# Patient Record
Sex: MALE | Race: WHITE | NOT HISPANIC OR LATINO | URBAN - METROPOLITAN AREA
[De-identification: names, ages, dates, MRNs, and addresses within clinical notes are randomized per-mention and may not be internally consistent; named-entity substitution may affect disease eponyms.]

---

## 2018-02-26 ENCOUNTER — APPOINTMENT (RX ONLY)
Dept: URBAN - METROPOLITAN AREA CLINIC 38 | Facility: CLINIC | Age: 38
Setting detail: DERMATOLOGY
End: 2018-02-26

## 2018-02-26 ENCOUNTER — RX ONLY (OUTPATIENT)
Age: 38
Setting detail: RX ONLY
End: 2018-02-26

## 2018-02-26 ENCOUNTER — APPOINTMENT (RX ONLY)
Dept: URBAN - METROPOLITAN AREA CLINIC 37 | Facility: CLINIC | Age: 38
Setting detail: DERMATOLOGY
End: 2018-02-26

## 2018-02-26 DIAGNOSIS — B07.8 OTHER VIRAL WARTS: ICD-10-CM

## 2018-02-26 PROBLEM — L55.1 SUNBURN OF SECOND DEGREE: Status: ACTIVE | Noted: 2018-02-26

## 2018-02-26 PROBLEM — L23.7 ALLERGIC CONTACT DERMATITIS DUE TO PLANTS, EXCEPT FOOD: Status: ACTIVE | Noted: 2018-02-26

## 2018-02-26 PROCEDURE — ? BENIGN DESTRUCTION

## 2018-02-26 PROCEDURE — ? COUNSELING

## 2018-02-26 PROCEDURE — 17110 DESTRUCTION B9 LES UP TO 14: CPT

## 2018-02-26 RX ORDER — CLINDAMYCIN PHOSPHATE 10 MG/ML
1 SOLUTION TOPICAL ONCE DAILY
Qty: 1 | Refills: 4 | Status: ERX

## 2018-02-26 ASSESSMENT — LOCATION SIMPLE DESCRIPTION DERM
LOCATION SIMPLE: RIGHT INFERIOR EYELID
LOCATION SIMPLE: RIGHT INFERIOR EYELID

## 2018-02-26 ASSESSMENT — LOCATION ZONE DERM
LOCATION ZONE: EYELID
LOCATION ZONE: EYELID

## 2018-02-26 ASSESSMENT — LOCATION DETAILED DESCRIPTION DERM
LOCATION DETAILED: RIGHT MEDIAL INFERIOR EYELID
LOCATION DETAILED: RIGHT MEDIAL INFERIOR EYELID

## 2018-02-26 NOTE — PROCEDURE: BENIGN DESTRUCTION
Detail Level: Detailed
Add 52 Modifier (Optional): no
Consent: The patient's consent was obtained including but not limited to risks of crusting, scabbing, blistering, scarring, darker or lighter pigmentary change, recurrence, incomplete removal and infection.
Medical Necessity Clause: This procedure was medically necessary because the lesions that were treated were:
Post-Care Instructions: I reviewed with the patient in detail post-care instructions. Patient is to wear sunprotection, and avoid picking at any of the treated lesions. Pt may apply Vaseline to crusted or scabbing areas.
Anesthesia Volume In Cc: 0.1
Anesthesia Type: 1% lidocaine with epinephrine
Treatment Number (Will Not Render If 0): 0
Medical Necessity Information: It is in your best interest to select a reason for this procedure from the list below. All of these items fulfill various CMS LCD requirements except the new and changing color options.

## 2018-02-26 NOTE — HPI: SKIN LESION (SKIN TAGS)
How Severe Are They?: mild
Is This A New Presentation, Or A Follow-Up?: Skin Lesion
Additional History: Pt wants the skin tag removed. He states it is irritated and itchy.

## 2018-02-26 NOTE — PROCEDURE: BENIGN DESTRUCTION
Consent: The patient's consent was obtained including but not limited to risks of crusting, scabbing, blistering, scarring, darker or lighter pigmentary change, recurrence, incomplete removal and infection.
Render Post-Care Instructions In Note?: no
Medical Necessity Clause: This procedure was medically necessary because the lesions that were treated were:
Medical Necessity Information: It is in your best interest to select a reason for this procedure from the list below. All of these items fulfill various CMS LCD requirements except the new and changing color options.
Anesthesia Type: 1% lidocaine with epinephrine
Post-Care Instructions: I reviewed with the patient in detail post-care instructions. Patient is to wear sunprotection, and avoid picking at any of the treated lesions. Pt may apply Vaseline to crusted or scabbing areas.
Detail Level: Detailed
Treatment Number (Will Not Render If 0): 0
Anesthesia Volume In Cc: 0.1

## 2018-07-20 ENCOUNTER — APPOINTMENT (RX ONLY)
Dept: URBAN - METROPOLITAN AREA CLINIC 38 | Facility: CLINIC | Age: 38
Setting detail: DERMATOLOGY
End: 2018-07-20

## 2018-07-20 ENCOUNTER — APPOINTMENT (RX ONLY)
Dept: URBAN - METROPOLITAN AREA CLINIC 37 | Facility: CLINIC | Age: 38
Setting detail: DERMATOLOGY
End: 2018-07-20

## 2018-07-20 DIAGNOSIS — L20.89 OTHER ATOPIC DERMATITIS: ICD-10-CM

## 2018-07-20 PROBLEM — L20.84 INTRINSIC (ALLERGIC) ECZEMA: Status: ACTIVE | Noted: 2018-07-20

## 2018-07-20 PROCEDURE — 96372 THER/PROPH/DIAG INJ SC/IM: CPT

## 2018-07-20 PROCEDURE — ? SEPARATE AND IDENTIFIABLE DOCUMENTATION

## 2018-07-20 PROCEDURE — ? PRESCRIPTION

## 2018-07-20 PROCEDURE — 99212 OFFICE O/P EST SF 10 MIN: CPT | Mod: 25

## 2018-07-20 PROCEDURE — ? COUNSELING

## 2018-07-20 PROCEDURE — ? INJECTION

## 2018-07-20 RX ORDER — TRIAMCINOLONE ACETONIDE 1 MG/G
CREAM TOPICAL BID
Qty: 1 | Refills: 1 | Status: ERX | COMMUNITY
Start: 2018-07-20

## 2018-07-20 RX ADMIN — TRIAMCINOLONE ACETONIDE THIN LAYER: 1 CREAM TOPICAL at 00:00

## 2018-07-20 ASSESSMENT — LOCATION SIMPLE DESCRIPTION DERM
LOCATION SIMPLE: LEFT BUTTOCK
LOCATION SIMPLE: LEFT PRETIBIAL REGION
LOCATION SIMPLE: LEFT BUTTOCK
LOCATION SIMPLE: LEFT PRETIBIAL REGION
LOCATION SIMPLE: RIGHT PRETIBIAL REGION
LOCATION SIMPLE: RIGHT PRETIBIAL REGION

## 2018-07-20 ASSESSMENT — LOCATION DETAILED DESCRIPTION DERM
LOCATION DETAILED: RIGHT DISTAL PRETIBIAL REGION
LOCATION DETAILED: LEFT BUTTOCK
LOCATION DETAILED: LEFT BUTTOCK
LOCATION DETAILED: LEFT PROXIMAL PRETIBIAL REGION
LOCATION DETAILED: RIGHT DISTAL PRETIBIAL REGION
LOCATION DETAILED: RIGHT PROXIMAL PRETIBIAL REGION
LOCATION DETAILED: LEFT DISTAL PRETIBIAL REGION
LOCATION DETAILED: LEFT DISTAL PRETIBIAL REGION
LOCATION DETAILED: LEFT PROXIMAL PRETIBIAL REGION
LOCATION DETAILED: RIGHT PROXIMAL PRETIBIAL REGION

## 2018-07-20 ASSESSMENT — LOCATION ZONE DERM
LOCATION ZONE: TRUNK
LOCATION ZONE: LEG
LOCATION ZONE: TRUNK
LOCATION ZONE: LEG

## 2018-07-20 NOTE — PROCEDURE: COUNSELING
Detail Level: Detailed
Patient Specific Counseling (Will Not Stick From Patient To Patient): TMC cream, BID, every day for two weeks and return for recheck.

## 2018-07-20 NOTE — HPI: RASH
How Severe Is Your Rash?: mild
Is This A New Presentation, Or A Follow-Up?: Rash
Additional History: Rash first came up in April, he went to the [facility] in June and was given a Cortisone injection, an oral antibiotic and a topical cream.  Rash went but came back up after the medication wore off. He went back to [facility] and was given a stronger medication. Patient at first thought he got into some poison Ivy.

## 2018-07-20 NOTE — HPI: RASH
How Severe Is Your Rash?: mild
Is This A New Presentation, Or A Follow-Up?: Rash
Additional History: Rash first came up in April, he went to the [facility] in June and was given a Cortisone injection, an oral antibiotic and a topical cream.  Rash went but came back up after the medication wore off. He went back to [facility] and was given a stronger medication. Patient at first thought he got into some poison Ivy.

## 2018-07-20 NOTE — PROCEDURE: INJECTION
Post-Care Instructions: I reviewed with the patient in detail post-care instructions. Patient understands to keep the injection sites clean and call the clinic if there is any redness, swelling or pain.
Dose Administered (Numbers Only - Mg, G, Mcg, Units, Cc): 40
Procedure Information: Please note that the numeric value listed in the Medication (1) and associated J-code units and Medication (2) and associated J-code units variables are j-code amounts and do not represent either the concentration or the total amount of the medications injected.  I strongly recommend selecting no to the Render J-code information in note question. This will allow your note to be more clear. If you are billing j-codes with your injection codes you need to document the total amount of the medication injected. This amount should match the j-code units. For example, if you are injecting Triamcinolone 40mg as an intramuscular injection you would select 40 for the dose field and mg for the units. This would allow you to document  with 4 units (40mg = 10mg x 4). The total volume is not used to calculate j-codes only the amount of the medication administered.
Bill J-Code: yes
Administered By (Optional): MARYJANE
Expiration Date (Optional): 8/2019
Medication (1) And Associated J-Code Units: Triamcinolone acetonide, 10mg
Lot # (Optional): [id number]
Route: IM
Consent: The risks of the medication was reviewed with the patient.  Patient denies HTN, diabetes, glaucoma, HIV/TB, and stomach ulcers.
Medication (2) And Associated J-Code Units: Depomedrol, 40mg
Total Volume Injected In Cc (Will Not Affected Billing): 2.0
Detail Level: None
Units: mg
Expiration Date (Optional): 06/2020
Treatment Number: 0
Lot # (Optional): [id number]

## 2018-07-20 NOTE — PROCEDURE: COUNSELING
Patient Specific Counseling (Will Not Stick From Patient To Patient): TMC cream, BID, every day for two weeks and return for recheck.
Detail Level: Detailed

## 2018-07-20 NOTE — PROCEDURE: INJECTION
Detail Level: None
Units: mg
Expiration Date (Optional): 06/2020
Route: IM
Consent: The risks of the medication was reviewed with the patient.  Patient denies HTN, diabetes, glaucoma, HIV/TB, and stomach ulcers.
Procedure Information: Please note that the numeric value listed in the Medication (1) and associated J-code units and Medication (2) and associated J-code units variables are j-code amounts and do not represent either the concentration or the total amount of the medications injected.  I strongly recommend selecting no to the Render J-code information in note question. This will allow your note to be more clear. If you are billing j-codes with your injection codes you need to document the total amount of the medication injected. This amount should match the j-code units. For example, if you are injecting Triamcinolone 40mg as an intramuscular injection you would select 40 for the dose field and mg for the units. This would allow you to document  with 4 units (40mg = 10mg x 4). The total volume is not used to calculate j-codes only the amount of the medication administered.
Dose Administered (Numbers Only - Mg, G, Mcg, Units, Cc): 40
Post-Care Instructions: I reviewed with the patient in detail post-care instructions. Patient understands to keep the injection sites clean and call the clinic if there is any redness, swelling or pain.
Render J-Code Information In Note?: yes
Lot # (Optional): [id number]
Medication (2) And Associated J-Code Units: Depomedrol, 40mg
Medication (1) And Associated J-Code Units: Triamcinolone acetonide, 10mg
Treatment Number: 0
Lot # (Optional): [id number]
Expiration Date (Optional): 8/2019
Total Volume Injected In Cc (Will Not Affected Billing): 2.0
Administered By (Optional): YURIDIA

## 2018-08-17 ENCOUNTER — APPOINTMENT (RX ONLY)
Dept: URBAN - METROPOLITAN AREA CLINIC 38 | Facility: CLINIC | Age: 38
Setting detail: DERMATOLOGY
End: 2018-08-17

## 2018-08-17 ENCOUNTER — APPOINTMENT (RX ONLY)
Dept: URBAN - METROPOLITAN AREA CLINIC 37 | Facility: CLINIC | Age: 38
Setting detail: DERMATOLOGY
End: 2018-08-17

## 2018-08-17 DIAGNOSIS — L20.89 OTHER ATOPIC DERMATITIS: ICD-10-CM

## 2018-08-17 PROBLEM — L20.84 INTRINSIC (ALLERGIC) ECZEMA: Status: ACTIVE | Noted: 2018-08-17

## 2018-08-17 PROCEDURE — 99212 OFFICE O/P EST SF 10 MIN: CPT

## 2018-08-17 PROCEDURE — ? PRESCRIPTION

## 2018-08-17 PROCEDURE — ? ORDER TESTS

## 2018-08-17 PROCEDURE — ? COUNSELING

## 2018-08-17 RX ORDER — TRIAMCINOLONE ACETONIDE 1 MG/G
1 OINTMENT TOPICAL BID
Qty: 1 | Refills: 0 | Status: ERX | COMMUNITY
Start: 2018-08-17

## 2018-08-17 RX ADMIN — TRIAMCINOLONE ACETONIDE 1: 1 OINTMENT TOPICAL at 14:10

## 2018-08-17 ASSESSMENT — LOCATION DETAILED DESCRIPTION DERM
LOCATION DETAILED: RIGHT PROXIMAL PRETIBIAL REGION
LOCATION DETAILED: RIGHT PROXIMAL PRETIBIAL REGION
LOCATION DETAILED: LEFT PROXIMAL PRETIBIAL REGION
LOCATION DETAILED: LEFT PROXIMAL PRETIBIAL REGION

## 2018-08-17 ASSESSMENT — LOCATION SIMPLE DESCRIPTION DERM
LOCATION SIMPLE: LEFT PRETIBIAL REGION
LOCATION SIMPLE: LEFT PRETIBIAL REGION
LOCATION SIMPLE: RIGHT PRETIBIAL REGION
LOCATION SIMPLE: RIGHT PRETIBIAL REGION

## 2018-08-17 ASSESSMENT — LOCATION ZONE DERM
LOCATION ZONE: LEG
LOCATION ZONE: LEG

## 2018-08-17 NOTE — PROCEDURE: COUNSELING
Detail Level: Simple
Patient Specific Counseling (Will Not Stick From Patient To Patient): Did a bacterial culture and changed the topical from cream to ointment. Discussed that he may not have absorbed the IM steroid and maybe oral would be a better choice but we opted to try topical only at this time.

## 2018-08-17 NOTE — PROCEDURE: COUNSELING
Patient Specific Counseling (Will Not Stick From Patient To Patient): Did a bacterial culture and changed the topical from cream to ointment. Discussed that he may not have absorbed the IM steroid and maybe oral would be a better choice but we opted to try topical only at this time.
Detail Level: Simple

## 2018-08-17 NOTE — PROCEDURE: ORDER TESTS
Lab Facility: [id number]
Billing Type: Third-Party Bill
Bill For Surgical Tray: no
Expected Date Of Service: 08/17/2018
Performing Laboratory: [id number]

## 2018-08-23 ENCOUNTER — RX ONLY (OUTPATIENT)
Age: 38
Setting detail: RX ONLY
End: 2018-08-23

## 2018-08-23 RX ORDER — CIPROFLOXACIN 500 MG/1
1 TABLET, FILM COATED ORAL BID
Qty: 14 | Refills: 0 | Status: ERX | COMMUNITY
Start: 2018-08-23

## 2018-10-03 ENCOUNTER — RX ONLY (OUTPATIENT)
Age: 38
Setting detail: RX ONLY
End: 2018-10-03

## 2018-10-03 RX ORDER — CLOBETASOL PROPIONATE 0.5 MG/G
1 OINTMENT TOPICAL
Qty: 1 | Refills: 0 | Status: ERX | COMMUNITY
Start: 2018-10-03

## 2019-01-01 ENCOUNTER — RX ONLY (OUTPATIENT)
Age: 39
Setting detail: RX ONLY
End: 2019-01-01

## 2021-09-28 ENCOUNTER — OFFICE VISIT (OUTPATIENT)
Dept: URBAN - METROPOLITAN AREA CLINIC 94 | Facility: CLINIC | Age: 41
End: 2021-09-28

## 2024-01-01 ENCOUNTER — RX ONLY (OUTPATIENT)
Age: 44
Setting detail: RX ONLY
End: 2024-01-01

## 2024-05-13 ENCOUNTER — APPOINTMENT (RX ONLY)
Dept: URBAN - METROPOLITAN AREA CLINIC 164 | Facility: CLINIC | Age: 44
Setting detail: DERMATOLOGY
End: 2024-05-13

## 2024-05-13 DIAGNOSIS — L73.0 ACNE KELOID: ICD-10-CM | Status: INADEQUATELY CONTROLLED

## 2024-05-13 PROCEDURE — ? COUNSELING

## 2024-05-13 PROCEDURE — ? PRESCRIPTION MEDICATION MANAGEMENT

## 2024-05-13 PROCEDURE — 99204 OFFICE O/P NEW MOD 45 MIN: CPT

## 2024-05-13 PROCEDURE — ? PRESCRIPTION

## 2024-05-13 RX ORDER — CLOBETASOL PROPIONATE 0.5 MG/ML
SOLUTION TOPICAL QHS
Qty: 50 | Refills: 3 | Status: ERX | COMMUNITY
Start: 2024-05-13

## 2024-05-13 RX ORDER — DOXYCYCLINE HYCLATE 100 MG/1
CAPSULE, GELATIN COATED ORAL
Qty: 60 | Refills: 0 | Status: ERX | COMMUNITY
Start: 2024-05-13

## 2024-05-13 RX ADMIN — DOXYCYCLINE HYCLATE: 100 CAPSULE, GELATIN COATED ORAL at 00:00

## 2024-05-13 RX ADMIN — CLOBETASOL PROPIONATE: 0.5 SOLUTION TOPICAL at 00:00

## 2024-05-13 ASSESSMENT — LOCATION SIMPLE DESCRIPTION DERM: LOCATION SIMPLE: POSTERIOR SCALP

## 2024-05-13 ASSESSMENT — LOCATION DETAILED DESCRIPTION DERM: LOCATION DETAILED: MID-OCCIPITAL SCALP

## 2024-05-13 ASSESSMENT — LOCATION ZONE DERM: LOCATION ZONE: SCALP

## 2024-05-13 NOTE — PROCEDURE: PRESCRIPTION MEDICATION MANAGEMENT
Render In Strict Bullet Format?: No
Initiate Treatment: Doxycycline 100mg po bid x 4 weeks. \\nClobetasol solution bid, 2 weeks on, 2 weeks off.
Detail Level: Zone

## 2024-05-13 NOTE — PROCEDURE: MIPS QUALITY
Quality 226: Preventive Care And Screening: Tobacco Use: Screening And Cessation Intervention: Patient screened for tobacco use and is an ex/non-smoker
Quality 431: Preventive Care And Screening: Unhealthy Alcohol Use - Screening: Patient not identified as an unhealthy alcohol user when screened for unhealthy alcohol use using a systematic screening method
Quality 130: Documentation Of Current Medications In The Medical Record: Current Medications Documented
Detail Level: Detailed
Name And Contact Information For Health Care Proxy: Yolanda Harvey (spouse), [phone]
Quality 47: Advance Care Plan: Advance Care Planning discussed and documented; advance care plan or surrogate decision maker documented in the medical record.

## 2024-05-13 NOTE — HPI: RASH
Is This A New Presentation, Or A Follow-Up?: Rash
How Severe Is Your Rash?: mild
Additional History: Patient stated that he has been treated in past but  not sure of the names of the meds. Knows one was an alcohol based solution. They did help but didn't take completely away.

## 2024-08-15 ENCOUNTER — APPOINTMENT (RX ONLY)
Dept: URBAN - METROPOLITAN AREA CLINIC 164 | Facility: CLINIC | Age: 44
Setting detail: DERMATOLOGY
End: 2024-08-15

## 2024-08-15 DIAGNOSIS — L73.0 ACNE KELOID: ICD-10-CM | Status: RESOLVING

## 2024-08-15 PROCEDURE — 99214 OFFICE O/P EST MOD 30 MIN: CPT

## 2024-08-15 PROCEDURE — ? COUNSELING

## 2024-08-15 PROCEDURE — ? PRESCRIPTION MEDICATION MANAGEMENT

## 2024-08-15 PROCEDURE — ? PRESCRIPTION

## 2024-08-15 RX ORDER — TRETINOIN 0.5 MG/G
THIN FILM GEL TOPICAL DAILY
Qty: 45 | Refills: 2 | Status: ACTIVE

## 2024-08-15 ASSESSMENT — LOCATION DETAILED DESCRIPTION DERM: LOCATION DETAILED: MID-OCCIPITAL SCALP

## 2024-08-15 ASSESSMENT — LOCATION ZONE DERM: LOCATION ZONE: SCALP

## 2024-08-15 ASSESSMENT — LOCATION SIMPLE DESCRIPTION DERM: LOCATION SIMPLE: POSTERIOR SCALP

## 2024-08-15 NOTE — HPI: PIMPLES (ACNE)
Is This A New Presentation, Or A Follow-Up?: Follow Up Acne
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

## 2024-08-15 NOTE — PROCEDURE: PRESCRIPTION MEDICATION MANAGEMENT
Render In Strict Bullet Format?: No
Detail Level: Zone
Continue Regimen: clobetasol solution is ok to use 2 weeks on/off and repeat as necessary
Initiate Treatment: ERX Tretinoin 0.05% gel every other night to scalp

## 2024-08-22 ENCOUNTER — RX ONLY (OUTPATIENT)
Age: 44
Setting detail: RX ONLY
End: 2024-08-22

## 2024-08-22 RX ORDER — TRETIONIN 0.5 MG/G
PEA-SIZED AMOUNT CREAM TOPICAL ONCE A DAY
Qty: 45 | Refills: 3 | Status: ERX | COMMUNITY
Start: 2024-08-22